# Patient Record
Sex: FEMALE | Race: WHITE | Employment: FULL TIME | URBAN - METROPOLITAN AREA
[De-identification: names, ages, dates, MRNs, and addresses within clinical notes are randomized per-mention and may not be internally consistent; named-entity substitution may affect disease eponyms.]

---

## 2024-10-09 PROBLEM — O35.9XX0 SUSPECTED FETAL ANOMALY, ANTEPARTUM: Status: ACTIVE | Noted: 2024-10-09

## 2024-10-09 PROBLEM — O09.92 HIGH-RISK PREGNANCY IN SECOND TRIMESTER: Status: ACTIVE | Noted: 2024-10-09

## 2024-10-09 PROBLEM — Z86.69 HISTORY OF MIGRAINE: Status: ACTIVE | Noted: 2024-10-09

## 2024-10-09 PROBLEM — J45.909 ASTHMA AFFECTING PREGNANCY IN SECOND TRIMESTER: Status: ACTIVE | Noted: 2024-10-09

## 2024-10-09 PROBLEM — F41.9 ANXIETY DURING PREGNANCY IN SECOND TRIMESTER, ANTEPARTUM: Status: ACTIVE | Noted: 2024-10-09

## 2024-10-09 PROBLEM — O99.342 ANXIETY DURING PREGNANCY IN SECOND TRIMESTER, ANTEPARTUM: Status: ACTIVE | Noted: 2024-10-09

## 2024-10-09 PROBLEM — O23.42 URINARY TRACT INFECTION IN MOTHER DURING SECOND TRIMESTER OF PREGNANCY: Status: ACTIVE | Noted: 2024-10-09

## 2024-10-09 PROBLEM — O99.512 ASTHMA AFFECTING PREGNANCY IN SECOND TRIMESTER: Status: ACTIVE | Noted: 2024-10-09

## 2024-10-16 ENCOUNTER — ROUTINE PRENATAL (OUTPATIENT)
Dept: OBGYN CLINIC | Age: 24
End: 2024-10-16
Payer: COMMERCIAL

## 2024-10-16 VITALS
HEIGHT: 61 IN | DIASTOLIC BLOOD PRESSURE: 78 MMHG | BODY MASS INDEX: 26.26 KG/M2 | SYSTOLIC BLOOD PRESSURE: 122 MMHG | WEIGHT: 139.1 LBS

## 2024-10-16 DIAGNOSIS — F41.9 ANXIETY DURING PREGNANCY IN SECOND TRIMESTER, ANTEPARTUM: ICD-10-CM

## 2024-10-16 DIAGNOSIS — O09.92 HIGH-RISK PREGNANCY IN SECOND TRIMESTER: Primary | ICD-10-CM

## 2024-10-16 DIAGNOSIS — Z86.69 HISTORY OF MIGRAINE: ICD-10-CM

## 2024-10-16 DIAGNOSIS — J45.909 ASTHMA AFFECTING PREGNANCY IN SECOND TRIMESTER: ICD-10-CM

## 2024-10-16 DIAGNOSIS — O99.512 ASTHMA AFFECTING PREGNANCY IN SECOND TRIMESTER: ICD-10-CM

## 2024-10-16 DIAGNOSIS — O99.342 ANXIETY DURING PREGNANCY IN SECOND TRIMESTER, ANTEPARTUM: ICD-10-CM

## 2024-10-16 DIAGNOSIS — Z3A.24 24 WEEKS GESTATION OF PREGNANCY: ICD-10-CM

## 2024-10-16 DIAGNOSIS — O23.42 URINARY TRACT INFECTION IN MOTHER DURING SECOND TRIMESTER OF PREGNANCY: ICD-10-CM

## 2024-10-16 DIAGNOSIS — O35.9XX0 SUSPECTED FETAL ANOMALY, ANTEPARTUM, SINGLE OR UNSPECIFIED FETUS: ICD-10-CM

## 2024-10-16 PROBLEM — I49.1 PAC (PREMATURE ATRIAL CONTRACTION): Status: RESOLVED | Noted: 2022-09-16 | Resolved: 2024-10-16

## 2024-10-16 PROCEDURE — 76825 ECHO EXAM OF FETAL HEART: CPT | Performed by: OBSTETRICS & GYNECOLOGY

## 2024-10-16 PROCEDURE — 76811 OB US DETAILED SNGL FETUS: CPT | Performed by: OBSTETRICS & GYNECOLOGY

## 2024-10-16 PROCEDURE — 76827 ECHO EXAM OF FETAL HEART: CPT | Performed by: OBSTETRICS & GYNECOLOGY

## 2024-10-16 PROCEDURE — 93325 DOPPLER ECHO COLOR FLOW MAPG: CPT | Performed by: OBSTETRICS & GYNECOLOGY

## 2024-10-16 PROCEDURE — 99205 OFFICE O/P NEW HI 60 MIN: CPT | Performed by: OBSTETRICS & GYNECOLOGY

## 2024-10-16 RX ORDER — ONDANSETRON 4 MG/1
TABLET, ORALLY DISINTEGRATING ORAL
COMMUNITY
Start: 2024-09-24

## 2024-10-16 RX ORDER — MONTELUKAST SODIUM 10 MG/1
10 TABLET ORAL DAILY
COMMUNITY

## 2024-10-16 RX ORDER — SERTRALINE HYDROCHLORIDE 25 MG/1
12.5 TABLET, FILM COATED ORAL DAILY
COMMUNITY

## 2024-10-16 RX ORDER — DOXYLAMINE SUCCINATE AND PYRIDOXINE HYDROCHLORIDE, DELAYED RELEASE TABLETS 10 MG/10 MG 10; 10 MG/1; MG/1
TABLET, DELAYED RELEASE ORAL
COMMUNITY
Start: 2024-09-08

## 2024-10-16 RX ORDER — ALBUTEROL SULFATE 90 UG/1
2 INHALANT RESPIRATORY (INHALATION) EVERY 4 HOURS PRN
COMMUNITY

## 2024-10-16 RX ORDER — CETIRIZINE HYDROCHLORIDE 10 MG/1
10 TABLET ORAL
COMMUNITY

## 2024-10-16 RX ORDER — FLUTICASONE PROPIONATE 50 MCG
1 SPRAY, SUSPENSION (ML) NASAL
COMMUNITY

## 2024-10-16 RX ORDER — ESOMEPRAZOLE MAGNESIUM 40 MG/1
CAPSULE, DELAYED RELEASE ORAL
COMMUNITY
Start: 2024-10-03

## 2024-10-16 ASSESSMENT — PATIENT HEALTH QUESTIONNAIRE - PHQ9
SUM OF ALL RESPONSES TO PHQ QUESTIONS 1-9: 0
SUM OF ALL RESPONSES TO PHQ QUESTIONS 1-9: 0
1. LITTLE INTEREST OR PLEASURE IN DOING THINGS: NOT AT ALL
SUM OF ALL RESPONSES TO PHQ QUESTIONS 1-9: 0
2. FEELING DOWN, DEPRESSED OR HOPELESS: NOT AT ALL
SUM OF ALL RESPONSES TO PHQ9 QUESTIONS 1 & 2: 0
SUM OF ALL RESPONSES TO PHQ QUESTIONS 1-9: 0

## 2024-10-16 NOTE — ASSESSMENT & PLAN NOTE
Low dose Aspirin  mg daily is recommended to be started at 12-16 weeks (some benefit seen with starting up to 28 weeks) for the prevention of preeclampsia  in high risk women. Consider stopping Aspirin at 36 weeks.  Recommend Vitamin D 2000IU daily and Calcium 1000mg daily to aid in protection of bones and teeth.  Recommend use of PNV daily with well-balanced diet.  Unless instructed otherwise, recommend continuation of physical activity throughout pregnancy.       Genetic counseling was performed by physician after reviewing patient's genetic history.    The patient's Down syndrome age associated risk, as well as, risks of additional aneuploidy and genetic syndromes, are reduced by approximately 50% with a normal anatomy ultrasound. Ultrasound alone does not rule out all abnormalities of genetics and development.     Maternal serum screening for aneuploidy was discussed with the patient including first trimester PADDY-A/hCG, second trimester Quad screen (either in isolation or sequential with PADDY-A) as well as non-invasive prenatal testing (NIPT) for aneuploidy from a maternal blood sample.  Positive predictive and negative predictive values for these tests were explained, questions answered. Patient understands that these are screening tests that only assesses risk for select abnormalities (trisomies 13, 18, and 21, and sex chromosome abnormalities (NIPT), as well as markers for placental health (PADDY-A) and risk for open neural tube defects (quad)).  NIPT is designed for high risk populations, but should be considered by all patients who desire the current best option for screening for applicable genetic abnormalities.     Limitations of technology discussed based on maternal age, technical aspects of tests, and maternal BMI reviewed.  All questions answered and concerns discussed.     Patient elected to proceed with Ultrasound only with no serum screening.

## 2024-10-16 NOTE — PATIENT INSTRUCTIONS
Resources for Depression/Anxiety  Postpartum Support International (PSI).    PSI Warmline:  1-778-955-4PPD (7890).  [email or web address]    Mom's IMPACTT  [email or web address]       In order to optimize maternal, fetal, and  health, we recommend the following vaccinations.   Flu- yearly (https://www.[email or web address]/flu-facts-[email or web address]-pregnancy)  Consider Covid vaccination/booster (https://www.[email or web address]/covid-[email or web address]-pregnancy)  TDaP after 28 weeks each pregnancy ([email or web address]://www.[email or web address]/tdap)  Consider RSV vaccine 32-36 weeks of pregnancy, if Sept- January.  ([email or web address]://[email or web address])

## 2024-10-16 NOTE — PROGRESS NOTES
[facility]ERNAL FETAL [facility]IC[facility]    [address] [place] [address]  P- [phone]  [phone]         MFM Consultation    Presents for evaluation of the following chief complaint(s):   Chief Complaint   Patient presents with    High Risk Pregnancy     Incomplete CRYSTAL at OB    Pregnancy Ultrasound     Anatomy and echo         Rocky Castro (2000) is a 24 y.o.  at 24w1d with 2025, by [employer].     Patient is working full time as RN at [employer].  Support person, Gal, present today. Expecting baby girl, Mackenzie Hendrix.   Personal and family history reviewed and updated as indicated.   Records from pregnancy reviewed. Chart updated to portray current issues.     Pt is scheduled to see Primary OB ([facility]) on 10/22/24.  Reports HA's; safe OTC medications reviewed. Denies Edema. Denies Pre-eclamptic symptoms. No bleeding or LOF.  No contractions or cramping. No vaginal pressure recently. Reports good fetal movement.      Mood evaluated today based on discussion with pt and PHQ screen.      10/16/2024     2:13 PM   PHQ-9    Little interest or pleasure in doing things 0   Feeling down, depressed, or hopeless 0   PHQ-2 Score 0   PHQ-9 Total Score 0      Mood Reassuring today  As mood stable and depression/anxiety well-controlled, will not change medications today.    Addressed normal pregnancy complaints, reassured and offered suggestions for care  Reviewed gestational age precautions and activity goals/limitations  Nutritional counseling as well as specific goals based on current maternal and fetal status  Options for GERD, constipation, other common complaints reviewed.   Reviewed gestational age appropriate preventive care regarding communicable disease transmission and vaccines as appropriate (including flu, TDaP >28wk, RSV 32-36wk, and COVID.)  Mood counseling today      Vitals:    10/09/24 1436 10/16/24 1518   BP:  122/78   Weight: 63.1 kg (139 lb 1.6 oz)    Height: 1.549 m (5'

## 2024-10-16 NOTE — ASSESSMENT & PLAN NOTE
Pt with preexisting migraine disease in pregnancy.     OTC options-   tylenol, caffeine, hydration, benadryl, mag oxide up to 800mg BID, riboflavin 400mg daily; lorenza-relief, excedrin migraine, allergy medications).   BeKool-type head patches, essential oils, dark room, warm compresses, mouthguard if jaw clenching/teeth grinding.    Prescription Recommendations-   reglan/benadryl combo  (MAD protocol)  imitrex/triptans recommended 3rd line.     If continued pain or concerns, recommend referral to neurology for additional recommendations.     Opioid medications, including fiorcet, may be considered if fails all non-opiate medications. But these are not recommended for use due to rebound headaches and  withdrawal concerns.   Ergotamines are not recommended in pregnancy

## 2024-10-17 NOTE — ASSESSMENT & PLAN NOTE
Fetal Ventricular Septal Defect: We discussed cardiac malformations in pregnancy. We discussed that one of the most common defects is a ventricular septal defect (or a hole in the baby's heart). We discussed that the vast majority of these defects close spontaneously prior to birth and that most do not require any surgical intervention. Today we did not identify a VSD, but this does not rule out a possible small VSD which would be clinically insignificant.

## 2024-11-23 SDOH — ECONOMIC STABILITY: FOOD INSECURITY: WITHIN THE PAST 12 MONTHS, YOU WORRIED THAT YOUR FOOD WOULD RUN OUT BEFORE YOU GOT MONEY TO BUY MORE.: NEVER TRUE

## 2024-11-23 SDOH — ECONOMIC STABILITY: INCOME INSECURITY: HOW HARD IS IT FOR YOU TO PAY FOR THE VERY BASICS LIKE FOOD, HOUSING, MEDICAL CARE, AND HEATING?: NOT HARD AT ALL

## 2024-11-23 SDOH — ECONOMIC STABILITY: FOOD INSECURITY: WITHIN THE PAST 12 MONTHS, THE FOOD YOU BOUGHT JUST DIDN'T LAST AND YOU DIDN'T HAVE MONEY TO GET MORE.: NEVER TRUE

## 2024-11-23 SDOH — ECONOMIC STABILITY: TRANSPORTATION INSECURITY
IN THE PAST 12 MONTHS, HAS LACK OF TRANSPORTATION KEPT YOU FROM MEETINGS, WORK, OR FROM GETTING THINGS NEEDED FOR DAILY LIVING?: NO

## 2024-11-26 ENCOUNTER — ROUTINE PRENATAL (OUTPATIENT)
Dept: OBGYN CLINIC | Age: 24
End: 2024-11-26
Payer: COMMERCIAL

## 2024-11-26 VITALS — DIASTOLIC BLOOD PRESSURE: 60 MMHG | SYSTOLIC BLOOD PRESSURE: 106 MMHG

## 2024-11-26 DIAGNOSIS — O23.42 URINARY TRACT INFECTION IN MOTHER DURING SECOND TRIMESTER OF PREGNANCY: ICD-10-CM

## 2024-11-26 DIAGNOSIS — F41.9 ANXIETY DURING PREGNANCY IN THIRD TRIMESTER, ANTEPARTUM: ICD-10-CM

## 2024-11-26 DIAGNOSIS — O09.93 HIGH-RISK PREGNANCY IN THIRD TRIMESTER: ICD-10-CM

## 2024-11-26 DIAGNOSIS — Z03.73 SUSPECTED FETAL ANOMALY NOT FOUND: Primary | ICD-10-CM

## 2024-11-26 DIAGNOSIS — O99.343 ANXIETY DURING PREGNANCY IN THIRD TRIMESTER, ANTEPARTUM: ICD-10-CM

## 2024-11-26 DIAGNOSIS — O99.019 ANEMIA DURING PREGNANCY: ICD-10-CM

## 2024-11-26 DIAGNOSIS — J45.909 ASTHMA AFFECTING PREGNANCY IN THIRD TRIMESTER: ICD-10-CM

## 2024-11-26 DIAGNOSIS — Z86.69 HISTORY OF MIGRAINE: ICD-10-CM

## 2024-11-26 DIAGNOSIS — Z3A.30 30 WEEKS GESTATION OF PREGNANCY: ICD-10-CM

## 2024-11-26 DIAGNOSIS — O99.513 ASTHMA AFFECTING PREGNANCY IN THIRD TRIMESTER: ICD-10-CM

## 2024-11-26 PROBLEM — D64.9 ANEMIA: Status: ACTIVE | Noted: 2024-11-07

## 2024-11-26 PROBLEM — D50.9 IRON DEFICIENCY ANEMIA: Status: RESOLVED | Noted: 2024-11-19 | Resolved: 2024-11-26

## 2024-11-26 PROCEDURE — 99214 OFFICE O/P EST MOD 30 MIN: CPT | Performed by: OBSTETRICS & GYNECOLOGY

## 2024-11-26 ASSESSMENT — PATIENT HEALTH QUESTIONNAIRE - PHQ9
1. LITTLE INTEREST OR PLEASURE IN DOING THINGS: NOT AT ALL
SUM OF ALL RESPONSES TO PHQ9 QUESTIONS 1 & 2: 0
SUM OF ALL RESPONSES TO PHQ QUESTIONS 1-9: 0
2. FEELING DOWN, DEPRESSED OR HOPELESS: NOT AT ALL

## 2024-11-26 NOTE — PATIENT INSTRUCTIONS
In order to optimize maternal, fetal, and  health, we recommend the following vaccinations.   Flu- yearly (https://www.[email or web address]/flu-facts-for-pregnancy)  Consider Covid vaccination/booster (https://www.[email or web address]/covid-19-pregnancy)  TDaP after 28 weeks each pregnancy (https://www.[email or web address]/tdap)  Consider RSV vaccine 32-36 weeks of pregnancy, if Sept- January.  (https://www.[email or web address]/rsv)            Resources for Depression/Anxiety  Postpartum Support International (PSI).    PSI Warmline:  7-470-597-4PPD (0615).  WWW.[email or web address].NET    Mom's IMPACTT  [email or web address]